# Patient Record
Sex: FEMALE | URBAN - METROPOLITAN AREA
[De-identification: names, ages, dates, MRNs, and addresses within clinical notes are randomized per-mention and may not be internally consistent; named-entity substitution may affect disease eponyms.]

---

## 2019-08-14 ENCOUNTER — APPOINTMENT (RX ONLY)
Dept: URBAN - METROPOLITAN AREA CLINIC 148 | Facility: CLINIC | Age: 64
Setting detail: DERMATOLOGY
End: 2019-08-14

## 2019-08-14 DIAGNOSIS — L81.4 OTHER MELANIN HYPERPIGMENTATION: ICD-10-CM

## 2019-08-14 DIAGNOSIS — L85.3 XEROSIS CUTIS: ICD-10-CM

## 2019-08-14 DIAGNOSIS — L82.1 OTHER SEBORRHEIC KERATOSIS: ICD-10-CM

## 2019-08-14 PROCEDURE — ? COUNSELING

## 2019-08-14 PROCEDURE — ? TREATMENT REGIMEN

## 2019-08-14 PROCEDURE — 99203 OFFICE O/P NEW LOW 30 MIN: CPT

## 2019-08-14 ASSESSMENT — LOCATION ZONE DERM
LOCATION ZONE: FACE
LOCATION ZONE: LEG
LOCATION ZONE: NOSE
LOCATION ZONE: ARM
LOCATION ZONE: HAND

## 2019-08-14 ASSESSMENT — LOCATION DETAILED DESCRIPTION DERM
LOCATION DETAILED: RIGHT PROXIMAL DORSAL FOREARM
LOCATION DETAILED: LEFT PROXIMAL DORSAL FOREARM
LOCATION DETAILED: RIGHT LATERAL EYEBROW
LOCATION DETAILED: RIGHT ANTERIOR PROXIMAL THIGH
LOCATION DETAILED: LEFT SUPERIOR CENTRAL MALAR CHEEK
LOCATION DETAILED: 2ND WEB SPACE RIGHT HAND
LOCATION DETAILED: NASAL TIP
LOCATION DETAILED: RIGHT SUPERIOR CENTRAL MALAR CHEEK

## 2019-08-14 ASSESSMENT — LOCATION SIMPLE DESCRIPTION DERM
LOCATION SIMPLE: RIGHT EYEBROW
LOCATION SIMPLE: LEFT CHEEK
LOCATION SIMPLE: RIGHT FOREARM
LOCATION SIMPLE: RIGHT CHEEK
LOCATION SIMPLE: LEFT FOREARM
LOCATION SIMPLE: RIGHT HAND
LOCATION SIMPLE: RIGHT THIGH
LOCATION SIMPLE: NOSE

## 2019-08-14 NOTE — PROCEDURE: MIPS QUALITY
Quality 431: Preventive Care And Screening: Unhealthy Alcohol Use - Screening: Patient screened for unhealthy alcohol use using a single question and scores less than 2 times per year
Quality 226: Preventive Care And Screening: Tobacco Use: Screening And Cessation Intervention: Patient screened for tobacco use and is an ex/non-smoker
Quality 111:Pneumonia Vaccination Status For Older Adults: Pneumococcal Vaccination Previously Received
Quality 110: Preventive Care And Screening: Influenza Immunization: Influenza Immunization Administered during Influenza season
Detail Level: Detailed

## 2021-03-01 NOTE — HPI: SKIN LESIONS
How Severe Is Your Skin Lesion?: mild
SUBJECTIVE  Patient ID: Caroline Sharif is a 52 
ELECTROPHYSIOLOGY  IP CONSULT TO CASE MANAGEMENT  IP CONSULT TO SOCIAL WORK  IP CONSULT TO HOSPITALIST  IP CONSULT TO CARDIOLOGY  IP CONSULT TO CARDIOTHORACIC SURGERY    Significant Diagnostic Studies This Admission:  · Paced EKG   · CXR without abnormality  · Pacer interrogation without abnormality   Labs from this Hospitalization Needing Follow Up:   · None     Discharge Lab Data:  Lab Results   Component Value Date/Time   WBC 7.0 02/14/2021 02:50 PM   HEMOGLOBIN 13.9 02/14/2021 02:50 PM   HEMATOCRIT 43.4 02/14/2021 02:50 PM   PLATELETS 231 02/14/2021 02:50 PM   SODIUM 137 02/14/2021 02:49 PM   CHLORIDE 101 02/14/2021 02:49 PM   POTASSIUM 4.0 02/14/2021 02:49 PM   CO2 27 02/14/2021 02:49 PM   BUN 12 02/14/2021 02:49 PM   CREATININE 0.76 02/14/2021 02:49 PM   GLUCOSE 102 (H) 02/14/2021 02:49 PM   INR 2.8 (H) 02/16/2021 05:00 AM   AST 29 02/14/2021 02:49 PM   ALT 16 02/14/2021 02:49 PM         Hospital Course:   Ms. Fer Baldwin was admitted with atypical chest pain described as electric shock like pain. Her EKG was paced, troponin negative, CXR without abnormality. Her pacer was interrogated and working properly. She was seen by CTS and EP, EP felt her pain was likely due to sternal wires and CTS will follow up with her as an outpatient to discuss removal.     She was started on Lyrica 50mg TID. She was instructed to call her PCP for up-titration if needed for pain control.    reduced pain by 40-50% with Lyrica  WAS ONLY TAKING BID  svetlana increase to TID        Has an apt with DR RUEDA TOMORROW  CARDIO/Monica on 3/3/2021      Stopped tramadol   Stopped medical cannabis as well   Taking lyrica and lidocaine patches and tylenol     Overall patient has been stable and improving in regards to her pain since starting Lyrica  Patient does have appointment set up with thoracic surgery/cardiology  Vitals have been stable        ALLERGIES:  No Known Allergies      ROS  Review of Systems   All other systems reviewed and are 
Have Your Skin Lesions Been Treated?: not been treated
Is This A New Presentation, Or A Follow-Up?: Skin Lesions
Raymond Kawasaki, MD at [facility].  
note and vitals reviewed.        ASSESSMENT and PLAN  Problem List Items Addressed This Visit        Nervous    Autonomic nervous system disorder - Primary    Chronic pain       Circulatory    Chronic diastolic heart failure (CMS/HCC)    Mitral valve disorder    Permanent atrial fibrillation    Complete heart block, post-surgical (CMS/HCC)    Presence of biventricular cardiac pacemaker       Other    Hospital discharge follow-up          Reviewed imaging/labs/reports from [facility] [place]  Overall stable.  Patient follow-up with Dr. Fonseca tomorrow.  Cardiology appointment set as well.  Increase Lyrica to 50 mg 3 times daily.  Prescription sent to pharmacy.    Medications reconciled    Patient to follow-up as needed for medication titration/pain management      Time spent:  40 minutes  >50% was devoted to counseling and coordinating care including review of labs, records and discussing further diagnostic testing and future f/u care.       Paulino Morel MD